# Patient Record
Sex: MALE | Race: WHITE | NOT HISPANIC OR LATINO | URBAN - METROPOLITAN AREA
[De-identification: names, ages, dates, MRNs, and addresses within clinical notes are randomized per-mention and may not be internally consistent; named-entity substitution may affect disease eponyms.]

---

## 2020-09-15 ENCOUNTER — AMBULATORY - HEALTHEAST (OUTPATIENT)
Dept: CARDIAC REHAB | Facility: CLINIC | Age: 30
End: 2020-09-15

## 2020-09-15 DIAGNOSIS — I21.4 NON-ST ELEVATION MI (NSTEMI) (H): ICD-10-CM

## 2020-09-15 DIAGNOSIS — Z95.5 STENTED CORONARY ARTERY: ICD-10-CM

## 2020-09-24 ENCOUNTER — AMBULATORY - HEALTHEAST (OUTPATIENT)
Dept: CARDIAC REHAB | Facility: CLINIC | Age: 30
End: 2020-09-24

## 2020-09-24 DIAGNOSIS — Z95.5 STENTED CORONARY ARTERY: ICD-10-CM

## 2020-09-24 DIAGNOSIS — I21.4 NON-ST ELEVATION MI (NSTEMI) (H): ICD-10-CM

## 2020-10-05 ENCOUNTER — AMBULATORY - HEALTHEAST (OUTPATIENT)
Dept: CARDIAC REHAB | Facility: CLINIC | Age: 30
End: 2020-10-05

## 2020-10-05 DIAGNOSIS — Z95.5 STENTED CORONARY ARTERY: ICD-10-CM

## 2020-10-05 DIAGNOSIS — I21.4 NON-ST ELEVATION MI (NSTEMI) (H): ICD-10-CM

## 2020-10-05 RX ORDER — CLOPIDOGREL BISULFATE 75 MG/1
75 TABLET ORAL DAILY
Status: SHIPPED | COMMUNITY
Start: 2020-10-05

## 2020-10-05 RX ORDER — ATORVASTATIN CALCIUM 80 MG/1
80 TABLET, FILM COATED ORAL AT BEDTIME
Status: SHIPPED | COMMUNITY
Start: 2020-10-05

## 2020-10-05 RX ORDER — CARVEDILOL 3.12 MG/1
3.12 TABLET ORAL 2 TIMES DAILY WITH MEALS
Status: SHIPPED | COMMUNITY
Start: 2020-10-05

## 2020-10-07 ENCOUNTER — AMBULATORY - HEALTHEAST (OUTPATIENT)
Dept: CARDIAC REHAB | Facility: CLINIC | Age: 30
End: 2020-10-07

## 2020-10-07 DIAGNOSIS — I21.4 NON-ST ELEVATION MI (NSTEMI) (H): ICD-10-CM

## 2020-10-07 DIAGNOSIS — Z95.5 STENTED CORONARY ARTERY: ICD-10-CM

## 2020-10-12 ENCOUNTER — AMBULATORY - HEALTHEAST (OUTPATIENT)
Dept: CARDIAC REHAB | Facility: CLINIC | Age: 30
End: 2020-10-12

## 2020-10-12 DIAGNOSIS — Z95.5 STENTED CORONARY ARTERY: ICD-10-CM

## 2020-10-12 DIAGNOSIS — I21.4 NON-ST ELEVATION MI (NSTEMI) (H): ICD-10-CM

## 2020-10-14 ENCOUNTER — AMBULATORY - HEALTHEAST (OUTPATIENT)
Dept: CARDIAC REHAB | Facility: CLINIC | Age: 30
End: 2020-10-14

## 2020-10-14 DIAGNOSIS — I21.4 NON-ST ELEVATION MI (NSTEMI) (H): ICD-10-CM

## 2020-10-14 DIAGNOSIS — Z95.5 STENTED CORONARY ARTERY: ICD-10-CM

## 2020-10-19 ENCOUNTER — AMBULATORY - HEALTHEAST (OUTPATIENT)
Dept: CARDIAC REHAB | Facility: CLINIC | Age: 30
End: 2020-10-19

## 2020-10-19 DIAGNOSIS — Z95.5 STENTED CORONARY ARTERY: ICD-10-CM

## 2020-10-19 DIAGNOSIS — I21.4 NON-ST ELEVATION MI (NSTEMI) (H): ICD-10-CM

## 2020-10-21 ENCOUNTER — AMBULATORY - HEALTHEAST (OUTPATIENT)
Dept: CARDIAC REHAB | Facility: CLINIC | Age: 30
End: 2020-10-21

## 2020-10-21 DIAGNOSIS — I21.4 NON-ST ELEVATION MI (NSTEMI) (H): ICD-10-CM

## 2020-10-21 DIAGNOSIS — Z95.5 STENTED CORONARY ARTERY: ICD-10-CM

## 2020-10-26 ENCOUNTER — AMBULATORY - HEALTHEAST (OUTPATIENT)
Dept: CARDIAC REHAB | Facility: CLINIC | Age: 30
End: 2020-10-26

## 2020-10-26 DIAGNOSIS — Z95.5 STENTED CORONARY ARTERY: ICD-10-CM

## 2020-10-26 DIAGNOSIS — I21.4 NON-ST ELEVATION MI (NSTEMI) (H): ICD-10-CM

## 2020-10-28 ENCOUNTER — AMBULATORY - HEALTHEAST (OUTPATIENT)
Dept: CARDIAC REHAB | Facility: CLINIC | Age: 30
End: 2020-10-28

## 2020-10-28 DIAGNOSIS — Z95.5 STENTED CORONARY ARTERY: ICD-10-CM

## 2020-10-28 DIAGNOSIS — I21.4 NON-ST ELEVATION MI (NSTEMI) (H): ICD-10-CM

## 2020-11-02 ENCOUNTER — AMBULATORY - HEALTHEAST (OUTPATIENT)
Dept: CARDIAC REHAB | Facility: CLINIC | Age: 30
End: 2020-11-02

## 2020-11-02 DIAGNOSIS — I21.4 NON-ST ELEVATION MI (NSTEMI) (H): ICD-10-CM

## 2020-11-02 DIAGNOSIS — Z95.5 STENTED CORONARY ARTERY: ICD-10-CM

## 2020-11-04 ENCOUNTER — AMBULATORY - HEALTHEAST (OUTPATIENT)
Dept: CARDIAC REHAB | Facility: CLINIC | Age: 30
End: 2020-11-04

## 2020-11-04 DIAGNOSIS — Z95.5 STENTED CORONARY ARTERY: ICD-10-CM

## 2020-11-04 DIAGNOSIS — I21.4 NON-ST ELEVATION MI (NSTEMI) (H): ICD-10-CM

## 2020-11-09 ENCOUNTER — AMBULATORY - HEALTHEAST (OUTPATIENT)
Dept: CARDIAC REHAB | Facility: CLINIC | Age: 30
End: 2020-11-09

## 2020-11-09 DIAGNOSIS — Z95.5 STENTED CORONARY ARTERY: ICD-10-CM

## 2020-11-09 DIAGNOSIS — I21.4 NON-ST ELEVATION MI (NSTEMI) (H): ICD-10-CM

## 2021-05-26 ENCOUNTER — RECORDS - HEALTHEAST (OUTPATIENT)
Dept: ADMINISTRATIVE | Facility: CLINIC | Age: 31
End: 2021-05-26

## 2021-06-05 VITALS — WEIGHT: 178.6 LBS

## 2021-06-05 VITALS — WEIGHT: 176.6 LBS

## 2021-06-05 VITALS — WEIGHT: 177 LBS

## 2021-06-05 VITALS — WEIGHT: 177.2 LBS

## 2021-06-05 VITALS — WEIGHT: 175.8 LBS

## 2021-06-05 VITALS — WEIGHT: 176 LBS

## 2021-06-05 VITALS — WEIGHT: 176.1 LBS

## 2021-06-05 VITALS — WEIGHT: 178 LBS

## 2021-06-05 VITALS — WEIGHT: 177.7 LBS

## 2021-06-05 VITALS — WEIGHT: 176.5 LBS

## 2021-06-11 NOTE — PROGRESS NOTES
ITP ASSESSMENT   Assessment Day: Initial    Session Number: 1  Precautions: STEMI, Stent     Diagnosis: Stent;MI    Risk Stratification: High    Referring Provider: Tony Solis MD   ITPs sent to Dr. Shankar  EXERCISE  Exercise Assessment: Initial       6 Minute Walk Test   Pre   Pre Exercise HR: 78                    Pre Exercise BP: 118/78      Peak  Peak HR: 91                   Peak BP: 132/76    Peak feet: 1300    Peak O2 SAT: 99    Peak RPE: 3    Peak MPH: 2.46      Symptoms:  Peak Symptoms: denies sx      5 mins. Post  5 Min Post HR: 80    5 Min Post BP: 116/72                           Exercise Plan  Goals Next 30 days  ADL'S: Goal #1: Pt to reached 3-4 METs for 20-30 min in rehab.     Leisure: Goal # 2: Pt to resume shopping with a cart without sx (3.5 METs).    Work: Goal #3/LTG: Pt to resume snow shoveling and climbing ladders without sx/sx (6-8 METs)       Education Goals: Medication review    Education Goals Met: Patient can state cardiac s/s and appropriate emergency response.;Has system for taking medication.      Exercise Prescription  Exercise Mode: Treadmill;Bike;Nustep;Arm Erg.;Stairs;Hallway Walking    Frequency: 1-2x/week    Duration: 30-50 min    Intensity / THR: 20-30 beats above resting heart rate    RPE 11-14  Progression / Met level: 3-4 METs    Resistive Training?: No      Current Exercise (mins/week): 45      Interventions  Home Exercise:  Mode: walking     Frequency: 3-4x/week    Duration: 15-20 min      Education Material : Provide written material;Individual education and counseling      Education Completed  Exercise Education Completed: Signs and Symptoms;RPE;Emergency Plan;Home Exercise;Warm up/cool down;FITT Principles;BP/HR Reponse to exercise;Benefits of Exercise;End point of exercise;Cardiac Anatomy              Exercise Follow-up/Discharge  Follow up/Discharge: Skilled therapy needed to monitor symptoms with ex. Pt had MI while on TM at home. Monitor heart rhythm and heart  "rate for proper CV response to ex to reached LTG of 6-8 METs. Educate pt on emergency plan. Pt plans on receiving Nitro in the future   NUTRITION  Nutrition Assessment: Initial      Nutrition Risk Factors:  Nutrition Risk Factors: Dyslipidemia  Cholesterol: 211 (9/9/20)  LDL: 158  HDL: 37  Triglycerides: 80      Nutrition Plan  Interventions  Other Nutrition Intervention: Therapist/Pt Discussion;Provide with Written Material      Education Completed  Nutrition Education Completed: Weight management;Risk factor overview      Goals  Nutrition Goals (Next 30 days): Patient will follow a low sodium diet;Patient will follow a low saturated fat diet;Patient knows appropriate portion size;Review Dietitian schedule      Goals Met  Nutrition Goals Met: Patient can identify their risk factors for CAD;Provided Rate your Plate Survey      Height, Weight, and  BMI  Weight: 178 lb 9.6 oz (81 kg)  Height: 5' 10\" (1.778 m)  BMI: 25.63         Other Risk Factors  Other Risk Factor Assessment: Initial      HTN Risk Factor: NA      Pre Exercise BP: 118/78  Post Exercise BP: 116/72      Tobacco Risk Factor: NA   PSYCHOSOCIAL  Psychosocial Assessment: Initial       [facility] Score: 13      PHQ-9 Total Score: 1      Psychosocial Risk Factor: Stress      Psychosocial Plan  Interventions  If PHQ-9 is >9, send letter to MD  Interventions: Provide written material;Individual education and counseling       Education Completed  Education Completed: Relaxation/Coping Techniques;S/S of depression;Effects of stress on body      Goals  Goals (Next 30 days): Identify stressors;Practicing stress management skills;Improvement in [employer] COOP score      Goals Met  Goals Met: Identified Support system;Identify stressors;Improvement in [employer] COOP score;Practicing stress management skills      Psychosocial Follow-up  Follow-up/Discharge: Pt denies stress. Pt turns to girlfriend for support and enjoys watching tv to relax.   "     Patient involved in Goal setting?: Yes      Signature: _____________________________________________________________    Date: __________________    Time: __________________

## 2021-06-11 NOTE — PROGRESS NOTES
Cardiac Rehab  Phase II Assessment    Assessment Date: 9/24/2020    Diagnosis: STEMI, Stents  Date of Onset: 9/9/2020  ICD/Pacemaker: No Parameters: NA  Post-op Complications: NA  ECG History: SR, TWI  EF%:44  Past Medical History: NA    Physical Assessment  Precautions/ Physical Limitations: NA  Oxygen: No  O2 Sats: 96 Lung Sounds: clear Edema: 0  Incisions: Healing well  Sleeping Pattern: good   Appetite: good     Pain  Location: NA  Characteristics:NA  Intensity: (0-10 scale) 0  Current Pain Management: NA  Intervention: NA  Response: NA    Psychosocial/ Emotional Health  1. In the past 12 months, have you been in a relationship where you have been abused physically, emotionally, sexually or financially? No  notified: NA  2. Who do you turn to for emotional support?: Girlfriend  3. Do you have cultural or spiritual needs? No  4. Have there been any major life changes in the past 12 months? No    Referral Information  Primary Physician: Provider, No Primary Care  Cardiologist:   Surgeon:     Home exercise/Equipment: Treadmill    Patient's long-term goal(s): Climbing ladders (8 METs), Shoveling snow (6METs)    1. Living Accommodations: Home Steps: Yes      Support people at home: No   2. Marital Status: single  3. Family is able to assist with cares      Mormonism/Community involvement: No  4. Recreation/Hobbies: Golfing

## 2021-06-12 NOTE — PROGRESS NOTES
Wing FLORIDALMA Gentile has participated in 7 sessions of Phase II Cardiac Rehab.    Progress Report:   Cardiac Rehab Treatment Progress Report 10/7/2020 10/12/2020 10/14/2020 10/19/2020 10/21/2020   Weight 177 lbs 175 lbs 13 oz 176 lbs 8 oz 178 lbs 176 lbs 2 oz   Pre Exercise  HR 63 80 65 62 72   Pre Exercise /72 112/68 100/68 108/68 112/60   Pre Blood Sugar (mg/dl) - - - - -   Treadmill Peak  120 119 130-134 130   Treadmill Peak Blood Pressure 132/70 140/62 136/64 126/64 136/68   Heart Rate 73 88 86 80 85   Post Exercise /68 112/70 88/64 98/62 116/60   ECG SR/SA w/ TWI in lead III, hyperacute T waves in V2 SR/SA w/ TWI in lead III, hyperacute T waves in V2 SR/SA w/ TWI in lead III, hyperacute T waves in V2 SR/SA/ST w/TWI in lead III- peaked T waves in V2 SR/SA/ST w/TWI in lead III- peaked T waves in V2   Total Exercise Minutes 40 43 43 43 43         Current Status:  Currently exercising without complaints or symptoms.    If Physician recommends change in treatment plan, please place orders.        __________________________________________________      _____________  Signature                                                                                                  Date

## 2021-06-12 NOTE — PROGRESS NOTES
ITP ASSESSMENT   Assessment Day: 30 Day    Session Number: 6  Precautions: STEMI, Stent , EF 44%    Diagnosis: Stent;MI    Risk Stratification: High    Referring Provider: Tony Solis MD   ITP sent to Dr. Shankar  EXERCISE  Exercise Assessment: Reassessment                         Exercise Plan  Goals Next 30 days  ADL'S: Goal #1: Pt to increase upper body strength to support climbing 1 flight of 12 stairs at home while carrying 1-15lbs by utilizing arm ergometer and/or weight 1-2x/week in cardiac rehab starting at 3lbs 1 set of 10 reps.     Leisure: Goal #2: Pt to increase MET level on the TM to a 5.5+ MET level for 33+ minutes while utilizing interval training to continue to increase pt's endurance and stamina.     Work: Goal #3/LTG: Pt to resume light snow shoveling (pt knows not recommended) for 20 minutes and climbing ladders without sx/sx (6-8 METs) .      Education Goals: All goals in this section met    Education Goals Met: Medication review.;Has system for taking medication.;Patient can state cardiac s/s and appropriate emergency response.                          Goals Met  Initial ADL's goals met: Goal #1 MET: Pt has reached a 5 MET level in cardiac rehab for 33 minutes utilizing interval training without any cv sx/sx.     Initial Leisure goals met: Goal #2 MET: PT has resumed grocery shopping with a cart for 20-30 minutes without any cv sx/sx. Pt has reached a 3.5 MET in cardiac rehab to support doing this activity.     Intial Work goals met: Goal #3 Not MET: Pt has not resumed snow shoveling and climbing ladders without sx/sx (6-8 METs). Pt is at a 5 MET level. Will continue same goal.      Initial Progression: Pt has reached a 5 MET level on the TM for 33 minutes utilizing interval training and a 3.6-3.7 MET level on the vision bike for 10-20 minutes without any cv sx/.sx. Pt is utilizing interval training and is progressing well. No specific limitations at this time.       Exercise  Prescription  Exercise Mode: Treadmill;Bike;Nustep;Arm Erg.;Stairs;Hallway Walking    Frequency: 2x/week    Duration: 40-45 minutes    Intensity / THR: ([place] 142-160)    RPE 11-14  Progression / Met level: 5-5.5+    Resistive Training?: Yes      Current Exercise (mins/week): 145      Interventions  Home Exercise:  Mode: Walking    Frequency: 2-3x/week    Duration: 30+ minutes      Education Material : Provide written material;Offer educational classes      Education Completed  Exercise Education Completed: Cardiac Anatomy;Signs and Symptoms;RPE;Medication review;Emergency Plan;Home Exercise;Warm up/cool down;BP/HR Reponse to exercise;FITT Principles;Stretching;Strength training;Benefits of Exercise;End point of exercise              Exercise Follow-up/Discharge  Follow up/Discharge: Skilled therapy needed to continue to monitor pt's EKG for any arrythmias s/p MI and stents. ALso, continue to monitor pt's proper cv response to exercise. WE will continue to educate pt on CAD and recovery as he continues to get back to activities at home. Pt is utilizing interval training in cardiac rehab and also at home to continue to increase his strength and endurance. Pt has noticed an improvement over the past 30 days. Pt is walking more at home on the TM. Goal is to increase pt to a 6-8 MET level to support home activities. Pt does have a 44% EF and did have f/u cardiac MRI to evaluate heart function and will f/u with cardiologist this wek. Pt is compliant with medications.    NUTRITION  Nutrition Assessment: Reassessment    Nutrition Risk Factors:  Nutrition Risk Factors: Dyslipidemia  Cholesterol: 211 (9/9/20)  LDL: 158  HDL: 37  Triglycerides: 80      Nutrition Plan  Interventions  Diet Consult: Declined    Other Nutrition Intervention: Therapist/Pt Discussion;Provide with Written Material    Initial Rate Your Plate Score: 47      Education Completed  Nutrition Education Completed: Weight management;Risk factor  "overview      Goals  Nutrition Goals (Next 30 days): Patient will follow a low saturated fat diet      Goals Met  Nutrition Goals Met: Patient can identify their risk factors for CAD;Provided Rate your Plate Survey;Patient follows a low sodium diet      Height, Weight, and  BMI  Weight: 176 lb 1.6 oz (79.9 kg)  Height: 5' 10\" (1.778 m)  BMI: 25.27      Nutrition Follow-up  Follow-up/Discharge: Patient states that he is looking at food labels and watching sodium.  Also reports eating more salads.       Other Risk Factors  Other Risk Factor Assessment: Reassessment      HTN Risk Factor: NA      Pre Exercise BP: 112/60  Post Exercise BP: 98/62 (asymptomatic)      Tobacco Risk Factor: NA           PSYCHOSOCIAL  Psychosocial Assessment: Reassessment       [place] COOP Q of L Summary Score: 13      PHQ-9 Total Score: 1      Psychosocial Risk Factor: Stress      Psychosocial Plan  Interventions  Interventions: Provide written material;Individual education and counseling      Education Completed  Education Completed: Relaxation/Coping Techniques;S/S of depression;Effects of stress on body      Goals  Goals (Next 30 days): Improvement in [employer] COOP score      Goals Met  Goals Met: Identified Support system;Identify stressors;Improvement in [employer] COOP score;Practicing stress management skills      Psychosocial Follow-up  Follow-up/Discharge: Pt reports low stress. Pt turns to girlfriend for support and enjoys watching tv to relax.       Patient involved in Goal setting?: Yes      Signature: _____________________________________________________________    Date: __________________    Time: __________________  "

## 2021-06-13 NOTE — PROGRESS NOTES
ITP ASSESSMENT   Assessment Day: Discharge    Session Number: 12  Precautions: STEMI, Stent , EF 44%    Diagnosis: Stent;MI    Risk Stratification: High    Referring Provider: Tony Solis MD   ITP:Dr. Shankar  EXERCISE  Exercise Assessment: Discharge                              Exercise Plan    Education Goals: All goals in this section met    Education Goals Met: Medication review.;Has system for taking medication.;Patient can state cardiac s/s and appropriate emergency response.                          Goals Met  30 day ADL'S goals met: Goal #1 not met, pt declined wts and arm erg    30 day Leisure goals met: Goal#2 met, pt reched 5.7 MET level on treadmill with interval training    30 day Work goals met: LTG-unsure if met, pt not present for discharge session so unable to review goals with him    30 Day Progression: Pt reached 5.7 MET level on treadmill, is exercising 3x/week at home for 30 min      Initial ADL's goals met: Goal #1 MET: Pt has reached a 5 MET level in cardiac rehab for 33 minutes utilizing interval training without any cv sx/sx.     Initial Leisure goals met: Goal #2 MET: PT has resumed grocery shopping with a cart for 20-30 minutes without any cv sx/sx. Pt has reached a 3.5 MET in cardiac rehab to support doing this activity.     Intial Work goals met: Goal #3 Not MET: Pt has not resumed snow shoveling and climbing ladders without sx/sx (6-8 METs). Pt is at a 5 MET level. Will continue same goal.      Initial Progression: Pt has reached a 5 MET level on the TM for 33 minutes utilizing interval training and a 3.6-3.7 MET level on the vision bike for 10-20 minutes without any cv sx/.sx. Pt is utilizing interval training and is progressing well. No specific limitations at this time.         Current Exercise (mins/week): 145      Interventions  Home Exercise:  Mode: walking/treadmill     Frequency: 4-5x/week    Duration: 30+ min      Education Material : Provide written material;Offer  "educational classes      Education Completed  Exercise Education Completed: Cardiac Anatomy;Signs and Symptoms;RPE;Medication review;Emergency Plan;Home Exercise;Warm up/cool down;BP/HR Reponse to exercise;FITT Principles;Stretching;Strength training;Benefits of Exercise;End point of exercise              Exercise Follow-up/Discharge  Follow up/Discharge: D/C, pt no showed for discharge session.   NUTRITION  Nutrition Assessment: Discharge      Nutrition Risk Factors:  Nutrition Risk Factors: Dyslipidemia  Cholesterol: 211  LDL: 158  HDL: 37  Triglycerides: 80      Nutrition Plan  Interventions  Diet Consult: Declined    Other Nutrition Intervention: Therapist/Pt Discussion;Provide with Written Material    Initial Rate Your Plate Score: 47    Follow-Up Rate Your Plate Score: 62      Education Completed  Nutrition Education Completed: Weight management;Risk factor overview      Goals  Nutrition Goals (Next 30 days): Patient will follow a low saturated fat diet      Goals Met  Nutrition Goals Met: Patient can identify their risk factors for CAD;Provided Rate your Plate Survey;Patient follows a low sodium diet      Height, Weight, and  BMI  Weight: 177 lb (80.3 kg)  Height: 5' 10\" (1.778 m)  BMI: 25.4      Nutrition Follow-up  Follow-up/Discharge: discharge         Other Risk Factors  Other Risk Factor Assessment: Discharge      HTN Risk Factor: NA      Pre Exercise BP: 116/68  Post Exercise BP: 116/72        Tobacco Risk Factor: NA       PSYCHOSOCIAL  Psychosocial Assessment: Discharge       [place] COOP Q of L Summary Score: 13      PHQ-9 Total Score: 1      Psychosocial Risk Factor: Stress      Psychosocial Plan  Interventions  Interventions: Provide written material;Individual education and counseling      Education Completed  Education Completed: Relaxation/Coping Techniques;S/S of depression;Effects of stress on body      Goals  Goals (Next 30 days): Improvement in [employer] COOP score      Goals Met  Goals " Met: Identified Support system;Identify stressors;Improvement in [facility] score;Practicing stress management skills      Psychosocial Follow-up  Follow-up/Discharge: Pt reports low stress. Pt turns to girlfriend for support and enjoys watching tv to relax.             Patient involved in Goal setting?: No, pt no showed for his discharge session and did not respond to phone call.      Signature: _____________________________________________________________    Date: __________________    Time: __________________

## 2024-05-18 ENCOUNTER — HEALTH MAINTENANCE LETTER (OUTPATIENT)
Age: 34
End: 2024-05-18

## 2025-06-08 ENCOUNTER — HEALTH MAINTENANCE LETTER (OUTPATIENT)
Age: 35
End: 2025-06-08